# Patient Record
Sex: MALE | Race: BLACK OR AFRICAN AMERICAN | URBAN - METROPOLITAN AREA
[De-identification: names, ages, dates, MRNs, and addresses within clinical notes are randomized per-mention and may not be internally consistent; named-entity substitution may affect disease eponyms.]

---

## 2021-07-08 ENCOUNTER — WEB ENCOUNTER (OUTPATIENT)
Dept: URBAN - METROPOLITAN AREA CLINIC 96 | Facility: CLINIC | Age: 81
End: 2021-07-08

## 2021-07-13 ENCOUNTER — OFFICE VISIT (OUTPATIENT)
Dept: URBAN - METROPOLITAN AREA CLINIC 96 | Facility: CLINIC | Age: 81
End: 2021-07-13
Payer: COMMERCIAL

## 2021-07-13 ENCOUNTER — WEB ENCOUNTER (OUTPATIENT)
Dept: URBAN - METROPOLITAN AREA CLINIC 96 | Facility: CLINIC | Age: 81
End: 2021-07-13

## 2021-07-13 VITALS
WEIGHT: 151 LBS | BODY MASS INDEX: 23.7 KG/M2 | HEART RATE: 69 BPM | HEIGHT: 67 IN | TEMPERATURE: 98.4 F | SYSTOLIC BLOOD PRESSURE: 160 MMHG | DIASTOLIC BLOOD PRESSURE: 96 MMHG

## 2021-07-13 DIAGNOSIS — K29.50 CHRONIC GASTRITIS WITHOUT BLEEDING, UNSPECIFIED GASTRITIS TYPE: ICD-10-CM

## 2021-07-13 DIAGNOSIS — K57.90 DIVERTICULOSIS: ICD-10-CM

## 2021-07-13 DIAGNOSIS — Z86.010 HISTORY OF COLON POLYPS: ICD-10-CM

## 2021-07-13 DIAGNOSIS — D50.9 IRON DEFICIENCY ANEMIA, UNSPECIFIED IRON DEFICIENCY ANEMIA TYPE: ICD-10-CM

## 2021-07-13 PROCEDURE — 99204 OFFICE O/P NEW MOD 45 MIN: CPT | Performed by: INTERNAL MEDICINE

## 2021-07-13 RX ORDER — LISINOPRIL 40 MG/1
1 TABLET TABLET ORAL ONCE A DAY
Status: ACTIVE | COMMUNITY

## 2021-07-13 RX ORDER — ROSUVASTATIN CALCIUM 10 MG/1
1 TABLET TABLET, FILM COATED ORAL ONCE A DAY
Status: ACTIVE | COMMUNITY

## 2021-07-13 RX ORDER — POLYETHYLENE GLYCOL 3350, SODIUM SULFATE, SODIUM CHLORIDE, POTASSIUM CHLORIDE, ASCORBIC ACID, SODIUM ASCORBATE 140-9-5.2G
AS DIRECTED KIT ORAL ONCE
Qty: 1 | Refills: 0 | OUTPATIENT
Start: 2021-07-13 | End: 2021-07-14

## 2021-07-13 RX ORDER — PANTOPRAZOLE SODIUM 20 MG/1
1 TABLET TABLET, DELAYED RELEASE ORAL ONCE A DAY
Status: ACTIVE | COMMUNITY

## 2021-07-13 NOTE — HPI-TODAY'S VISIT:
Patient being seen in consultation as requested by Dr. Marline Rich. Patient was inpatient for anemia and received 8 units of PRBC per wife. Was told he was iron deficient and also received IV iron per wife. No melena, no rectal bleeding, no changes in BM.  A copy of this document andrew be sent to the physician.   EGD was done as inpatient 6/30/2021 with chronic gastritis, small hiatal hernia with no active bleeding or stigmata of any recent bleeding. Gastric antral biopsies with minimal chronic inflammation with no H pylori.  Inpatient colonoscopy was NOT done given patient had colonoscopy at [facility] 10/2020 with diverticulosis and internal hemorrhoids. Hb 6/30/2021 6.5. Recent lab 7/7/2021 with Hb 10.8. (see chart for reviewed records)  Patient was taking baby ASA, no longer taking. Has pacemaker in place.  No easy bruising, no easy bleeding. No unintentional weight loss. No family hx of anemia or GI CA.   Prior alcohol hx, quit in 6/2021. Quit smoking 25 years ago.

## 2021-07-13 NOTE — PHYSICAL EXAM CHEST:
no lesions,  no deformities,  no traumatic injuries, scars are present, PACEMAKER LEFT CHEST chest wall non-tender,  no masses present, breathing is unlabored without accessory muscle use, normal breath sounds

## 2021-07-14 ENCOUNTER — TELEPHONE ENCOUNTER (OUTPATIENT)
Dept: URBAN - METROPOLITAN AREA CLINIC 98 | Facility: CLINIC | Age: 81
End: 2021-07-14

## 2021-08-02 ENCOUNTER — OFFICE VISIT (OUTPATIENT)
Dept: URBAN - METROPOLITAN AREA MEDICAL CENTER 28 | Facility: MEDICAL CENTER | Age: 81
End: 2021-08-02
Payer: COMMERCIAL

## 2021-08-02 DIAGNOSIS — Z86.010 H/O ADENOMATOUS POLYP OF COLON: ICD-10-CM

## 2021-08-02 DIAGNOSIS — D50.9 ANEMIA, IRON DEFICIENCY: ICD-10-CM

## 2021-08-02 PROCEDURE — 45378 DIAGNOSTIC COLONOSCOPY: CPT | Performed by: INTERNAL MEDICINE

## 2021-08-18 ENCOUNTER — DASHBOARD ENCOUNTERS (OUTPATIENT)
Age: 81
End: 2021-08-18

## 2021-08-18 ENCOUNTER — OFFICE VISIT (OUTPATIENT)
Dept: URBAN - METROPOLITAN AREA CLINIC 96 | Facility: CLINIC | Age: 81
End: 2021-08-18
Payer: COMMERCIAL

## 2021-08-18 VITALS
TEMPERATURE: 98.2 F | WEIGHT: 155 LBS | SYSTOLIC BLOOD PRESSURE: 139 MMHG | BODY MASS INDEX: 24.33 KG/M2 | HEIGHT: 67 IN | DIASTOLIC BLOOD PRESSURE: 80 MMHG | HEART RATE: 67 BPM

## 2021-08-18 DIAGNOSIS — K57.90 DIVERTICULOSIS: ICD-10-CM

## 2021-08-18 DIAGNOSIS — K64.9 HEMORRHOIDS, UNSPECIFIED HEMORRHOID TYPE: ICD-10-CM

## 2021-08-18 DIAGNOSIS — Z86.010 HISTORY OF COLON POLYPS: ICD-10-CM

## 2021-08-18 DIAGNOSIS — D50.9 IRON DEFICIENCY ANEMIA, UNSPECIFIED IRON DEFICIENCY ANEMIA TYPE: ICD-10-CM

## 2021-08-18 DIAGNOSIS — K29.50 CHRONIC GASTRITIS WITHOUT BLEEDING, UNSPECIFIED GASTRITIS TYPE: ICD-10-CM

## 2021-08-18 PROBLEM — 8493009: Status: ACTIVE | Noted: 2021-07-13

## 2021-08-18 PROBLEM — 397881000: Status: ACTIVE | Noted: 2021-07-13

## 2021-08-18 PROBLEM — 428283002: Status: ACTIVE | Noted: 2021-07-13

## 2021-08-18 PROBLEM — 87522002: Status: ACTIVE | Noted: 2021-07-13

## 2021-08-18 PROCEDURE — 99214 OFFICE O/P EST MOD 30 MIN: CPT | Performed by: INTERNAL MEDICINE

## 2021-08-18 RX ORDER — ROSUVASTATIN CALCIUM 10 MG/1
1 TABLET TABLET, FILM COATED ORAL ONCE A DAY
COMMUNITY

## 2021-08-18 RX ORDER — TAMSULOSIN HYDROCHLORIDE 0.4 MG/1
1 CAPSULE CAPSULE ORAL ONCE A DAY
Status: ACTIVE | COMMUNITY

## 2021-08-18 RX ORDER — LISINOPRIL 40 MG/1
1 TABLET TABLET ORAL ONCE A DAY
COMMUNITY

## 2021-08-18 RX ORDER — PANTOPRAZOLE SODIUM 20 MG/1
1 TABLET TABLET, DELAYED RELEASE ORAL ONCE A DAY
COMMUNITY

## 2021-08-18 NOTE — HPI-TODAY'S VISIT:
Patient seen 7/13/2021 for anemia follow up. As noted prior, patient was inpatient for anemia and received 8 units of PRBC per wife. Was told he was iron deficient and also received IV iron per wife.   EGD was done as inpatient 6/30/2021 with chronic gastritis, small hiatal hernia with no active bleeding or stigmata of any recent bleeding. Gastric antral biopsies with minimal chronic inflammation with no H pylori.  Inpatient colonoscopy was NOT done given patient had colonoscopy at [facility] 10/2020 with diverticulosis and internal hemorrhoids. Hb 6/30/2021 6.5. Recent lab 7/7/2021 with Hb 10.8. (see chart for reviewed records)  Patient was taking baby ASA, no longer taking. Has pacemaker in place.  No easy bruising, no easy bleeding. No unintentional weight loss. No family hx of anemia or GI CA.   Prior alcohol hx, quit in 6/2021. Quit smoking 25 years ago.

## 2021-08-18 NOTE — HPI-TODAY'S VISIT:
Colonoscopy performed 8/2/2021 with diverticulosis, otherwise unremarkable examination to the terminal ileum.  Unable to retroflex due to small rectal vault with circumferential exam unremarkable.  Hemorrhoids were noted.  Patient reports was recently to the [place] ER for bleeding "from his penis". "Drops" of blood noted. No rectal bleeding. Labs were drawn and patient was discharged from ER. Per wife, patient with constipation. ER rec Miralax and Flomax was prescribed. No further bleeding.   He denies any rectal bleeding, no melena, no abdominal pain. Still reports constipation and straining with stools. Starting Miralax as rec by ER.

## 2021-08-18 NOTE — PHYSICAL EXAM GASTROINTESTINAL
Abdomen , soft, nontender, nondistended , no guarding or rigidity , no masses palpable , normal bowel sounds , Liver and Spleen , no hepatomegaly present , no hepatosplenomegaly , liver nontender , spleen not palpable Patient/Chart(s)

## 2022-04-30 ENCOUNTER — TELEPHONE ENCOUNTER (OUTPATIENT)
Dept: URBAN - METROPOLITAN AREA CLINIC 121 | Facility: CLINIC | Age: 82
End: 2022-04-30

## 2022-05-01 ENCOUNTER — TELEPHONE ENCOUNTER (OUTPATIENT)
Dept: URBAN - METROPOLITAN AREA CLINIC 121 | Facility: CLINIC | Age: 82
End: 2022-05-01